# Patient Record
Sex: MALE | Race: BLACK OR AFRICAN AMERICAN | NOT HISPANIC OR LATINO
[De-identification: names, ages, dates, MRNs, and addresses within clinical notes are randomized per-mention and may not be internally consistent; named-entity substitution may affect disease eponyms.]

---

## 2022-05-06 PROBLEM — Z00.00 ENCOUNTER FOR PREVENTIVE HEALTH EXAMINATION: Status: ACTIVE | Noted: 2022-05-06

## 2022-05-09 ENCOUNTER — NON-APPOINTMENT (OUTPATIENT)
Age: 72
End: 2022-05-09

## 2022-05-09 ENCOUNTER — APPOINTMENT (OUTPATIENT)
Dept: UROLOGY | Facility: CLINIC | Age: 72
End: 2022-05-09
Payer: MEDICARE

## 2022-05-09 VITALS
HEIGHT: 71 IN | BODY MASS INDEX: 29.12 KG/M2 | DIASTOLIC BLOOD PRESSURE: 86 MMHG | SYSTOLIC BLOOD PRESSURE: 135 MMHG | HEART RATE: 68 BPM | WEIGHT: 208 LBS | TEMPERATURE: 97.2 F

## 2022-05-09 DIAGNOSIS — Z86.79 PERSONAL HISTORY OF OTHER DISEASES OF THE CIRCULATORY SYSTEM: ICD-10-CM

## 2022-05-09 LAB
BILIRUB UR QL STRIP: NORMAL
CLARITY UR: CLEAR
COLLECTION METHOD: NORMAL
GLUCOSE UR-MCNC: NORMAL
HCG UR QL: 0.2 EU/DL
HGB UR QL STRIP.AUTO: NORMAL
KETONES UR-MCNC: NORMAL
LEUKOCYTE ESTERASE UR QL STRIP: NORMAL
NITRITE UR QL STRIP: NORMAL
PH UR STRIP: 5.5
PROT UR STRIP-MCNC: NORMAL
SP GR UR STRIP: 1.02

## 2022-05-09 PROCEDURE — 99204 OFFICE O/P NEW MOD 45 MIN: CPT

## 2022-05-09 PROCEDURE — 81003 URINALYSIS AUTO W/O SCOPE: CPT | Mod: QW

## 2022-05-09 RX ORDER — ASPIRIN 81 MG
81 TABLET,CHEWABLE ORAL
Refills: 0 | Status: ACTIVE | COMMUNITY

## 2022-05-09 RX ORDER — AZELASTINE HYDROCHLORIDE 137 UG/1
137 SPRAY, METERED NASAL
Qty: 30 | Refills: 0 | Status: ACTIVE | COMMUNITY
Start: 2021-11-16

## 2022-05-09 RX ORDER — ALLOPURINOL 100 MG/1
100 TABLET ORAL
Qty: 90 | Refills: 0 | Status: ACTIVE | COMMUNITY
Start: 2021-10-03

## 2022-05-09 RX ORDER — METOPROLOL SUCCINATE 100 MG/1
100 TABLET, EXTENDED RELEASE ORAL
Qty: 30 | Refills: 0 | Status: ACTIVE | COMMUNITY
Start: 2021-10-03

## 2022-05-09 RX ORDER — ROSUVASTATIN CALCIUM 40 MG/1
40 TABLET, FILM COATED ORAL
Qty: 90 | Refills: 0 | Status: ACTIVE | COMMUNITY
Start: 2022-05-04

## 2022-05-09 RX ORDER — EZETIMIBE 10 MG/1
10 TABLET ORAL
Qty: 30 | Refills: 0 | Status: ACTIVE | COMMUNITY
Start: 2022-02-27

## 2022-05-09 NOTE — ASSESSMENT
[FreeTextEntry1] : 70 yo male with hx of microscopic hematuria.  \par \par A discussion was held with the patient regarding hematuria. The anatomy of the urinary tract was reviewed. Possible etiologies of hematuria were reviewed, including but not limited to, kidney, ureteral, bladder, and urethral sources. Urologic versus non-urologic causes of hematuria were reviewed including benign and malignant causes. The possibility of a missed lesion was reviewed as well as the chance of developing a lesion despite a negative evaluation. The work-up for hematuria was discussed including examination of the upper and lower urinary tract. The AUA guidelines on hematuria were reviewed. Follow up evaluation was reviewed. The possible need to repeat the evaluation was also discussed. \par \par The UA is not available for review today to determine if the dip or the microcopy showed blood.  I will repeat the UA today and if the microscopy shows 5 or more RBC then we will proceed with a hematuria evaluation. \par \par Plan:\par 1. UA\par 2. F/u after # 1

## 2022-05-09 NOTE — PHYSICAL EXAM
[General Appearance - Well Developed] : well developed [Normal Appearance] : normal appearance [Heart Rate And Rhythm] : Heart rate and rhythm were normal [] : no respiratory distress [Abdomen Soft] : soft [Abdomen Tenderness] : non-tender [Costovertebral Angle Tenderness] : no ~M costovertebral angle tenderness [FreeTextEntry1] : Umbilical hernia [Urethral Meatus] : meatus normal [Penis Abnormality] : normal uncircumcised penis [Epididymis] : the epididymides were normal [Testes Tenderness] : no tenderness of the testes [Testes Mass (___cm)] : there were no testicular masses [Prostate Tenderness] : the prostate was not tender [No Prostate Nodules] : no prostate nodules [Prostate Size ___ (0-4)] : prostate size [unfilled] (scale: 0-4) [Normal Station and Gait] : the gait and station were normal for the patient's age [Skin Color & Pigmentation] : normal skin color and pigmentation [No Focal Deficits] : no focal deficits [Oriented To Time, Place, And Person] : oriented to person, place, and time

## 2022-05-09 NOTE — HISTORY OF PRESENT ILLNESS
[FreeTextEntry1] : 70 yo male referred for hx of microscopic hematuria incidentally noted on UA.  He denies ever having gross hematuria.  He voids with minimal symptoms.  He notes occasional nocturia x 1, but generally voids without frequency urgency or dysuria.  He is happy with his urination.  Her sees his PCP annually hand has always had a PSA and SUZE wnl.  His most recent PSA was this year and was 2.1

## 2022-05-10 LAB
APPEARANCE: CLEAR
BACTERIA: NEGATIVE
BILIRUBIN URINE: NEGATIVE
BLOOD URINE: ABNORMAL
COLOR: YELLOW
GLUCOSE QUALITATIVE U: NEGATIVE
HYALINE CASTS: 0 /LPF
KETONES URINE: NEGATIVE
LEUKOCYTE ESTERASE URINE: ABNORMAL
MICROSCOPIC-UA: NORMAL
NITRITE URINE: NEGATIVE
PH URINE: 6
PROTEIN URINE: ABNORMAL
RED BLOOD CELLS URINE: 51 /HPF
SPECIFIC GRAVITY URINE: 1.02
SQUAMOUS EPITHELIAL CELLS: 0 /HPF
UROBILINOGEN URINE: NORMAL
WHITE BLOOD CELLS URINE: 14 /HPF

## 2022-05-11 LAB — BACTERIA UR CULT: NORMAL

## 2022-05-17 ENCOUNTER — OUTPATIENT (OUTPATIENT)
Dept: OUTPATIENT SERVICES | Facility: HOSPITAL | Age: 72
LOS: 1 days | End: 2022-05-17
Payer: COMMERCIAL

## 2022-05-17 ENCOUNTER — APPOINTMENT (OUTPATIENT)
Dept: CT IMAGING | Facility: HOSPITAL | Age: 72
End: 2022-05-17

## 2022-05-17 LAB — POCT ISTAT CREATININE: 1.2 MG/DL — SIGNIFICANT CHANGE UP (ref 0.5–1.3)

## 2022-05-17 PROCEDURE — 74178 CT ABD&PLV WO CNTR FLWD CNTR: CPT

## 2022-05-17 PROCEDURE — 74178 CT ABD&PLV WO CNTR FLWD CNTR: CPT | Mod: 26

## 2022-05-17 PROCEDURE — 82565 ASSAY OF CREATININE: CPT

## 2022-05-26 ENCOUNTER — APPOINTMENT (OUTPATIENT)
Dept: UROLOGY | Facility: CLINIC | Age: 72
End: 2022-05-26
Payer: MEDICARE

## 2022-05-26 VITALS
OXYGEN SATURATION: 99 % | HEART RATE: 88 BPM | SYSTOLIC BLOOD PRESSURE: 149 MMHG | DIASTOLIC BLOOD PRESSURE: 88 MMHG | TEMPERATURE: 98.2 F

## 2022-05-26 PROCEDURE — 99214 OFFICE O/P EST MOD 30 MIN: CPT

## 2022-05-26 NOTE — ASSESSMENT
[FreeTextEntry1] : 72 yo male with hx of microscopic hematuria found to have bilateral kidney stones and a bladder diverticulum.  \par \par The patient was seen and examined and results were reviewed. \par \par Kidney stone disease was reviewed with the patient and etiologies/risk factors were discussed. Preventative measures were discussed including hydration, diet modification, and medications.Options for management were reviewed including conservative management (no intervention), medical expulsive therapy, and surgical management. Merits and limitations of each option was discussed. \par \par The possibility of spontaneous stone passage based on stone size and location was reviewed. Risks of spontaneous passage including pain, hematuria, fever, chills, nausea/emesis, infection, urosepsis, dehydration, ureteral or renal injury, need for repeat office or emergency room visits, and permanent loss of renal function were reviewed.\par \par Surgical options were presented including ureteral stent placement, extracorporeal shockwave lithotripsy (ESWL), and ureteroscopy with laser lithotripsy. The procedures were discussed in depth. Success rates, complications, and potential for subsequent procedures was discussed for each option.The role of imaging studies including ultrasound, plain film x-rays, and CT scan were discussed, as well as potential radiation exposure risks. Metabolic evaluation with serum chemistry and 24 hour urine collections were presented for the purpose of determining the etiology of the patient's stone forming risk factors.\par \par He prefers to observe the stone for now.  We will repeat a renal US in 6 months.\par \par We also discussed the bladder diverticulum and his microscopic hematuria.  I recommend we also perform cystoscopy to complete his evaluation and evaluate his bladder diverticulum.  He agrees to do this next week.\par \par Plan:\par 1. Cystoscopy next week\par 2. Renal US in 6 months

## 2022-05-26 NOTE — HISTORY OF PRESENT ILLNESS
[FreeTextEntry1] : 5/9/22:\par 72 yo male referred for hx of microscopic hematuria incidentally noted on UA.  He denies ever having gross hematuria.  He voids with minimal symptoms.  He notes occasional nocturia x 1, but generally voids without frequency urgency or dysuria.  He is happy with his urination.  Her sees his PCP annually hand has always had a PSA and SUZE wnl.  His most recent PSA was this year and was 2.1\par \par *************\par 5/26/22:\par Patient returns for follow up to discuss his CT urogram results.  CT shows bilateral L > R nonobstructing renal stones, with the largest stone in the left kidney being 6 mm in size in the posterior calyx of the mid pole.  The CT also shows a bladder diverticulum coming off the bladder dome possibly a urachal remnant. The prostate was measured at roughly 40 cc.

## 2022-05-31 LAB — BACTERIA UR CULT: NORMAL

## 2022-06-02 ENCOUNTER — APPOINTMENT (OUTPATIENT)
Dept: UROLOGY | Facility: CLINIC | Age: 72
End: 2022-06-02

## 2022-06-20 ENCOUNTER — APPOINTMENT (OUTPATIENT)
Dept: UROLOGY | Facility: CLINIC | Age: 72
End: 2022-06-20
Payer: MEDICARE

## 2022-06-20 VITALS
DIASTOLIC BLOOD PRESSURE: 82 MMHG | OXYGEN SATURATION: 99 % | HEART RATE: 65 BPM | TEMPERATURE: 97.5 F | SYSTOLIC BLOOD PRESSURE: 143 MMHG

## 2022-06-20 DIAGNOSIS — N32.3 DIVERTICULUM OF BLADDER: ICD-10-CM

## 2022-06-20 LAB
BILIRUB UR QL STRIP: NORMAL
CLARITY UR: CLEAR
COLLECTION METHOD: NORMAL
GLUCOSE UR-MCNC: NORMAL
HCG UR QL: 0.2 EU/DL
HGB UR QL STRIP.AUTO: NORMAL
KETONES UR-MCNC: NORMAL
LEUKOCYTE ESTERASE UR QL STRIP: NORMAL
NITRITE UR QL STRIP: NORMAL
PH UR STRIP: 6
PROT UR STRIP-MCNC: 100
SP GR UR STRIP: 1.02

## 2022-06-20 PROCEDURE — 52000 CYSTOURETHROSCOPY: CPT

## 2022-06-20 PROCEDURE — 81003 URINALYSIS AUTO W/O SCOPE: CPT | Mod: QW

## 2022-06-21 LAB — URINE CYTOLOGY: NORMAL

## 2022-06-22 LAB
BACTERIA UR CULT: NORMAL
HLX UV FISH FINAL REPORT: NORMAL

## 2024-01-31 ENCOUNTER — APPOINTMENT (OUTPATIENT)
Dept: UROLOGY | Facility: CLINIC | Age: 74
End: 2024-01-31
Payer: MEDICARE

## 2024-01-31 VITALS
OXYGEN SATURATION: 99 % | HEIGHT: 71 IN | SYSTOLIC BLOOD PRESSURE: 143 MMHG | TEMPERATURE: 98.6 F | WEIGHT: 208 LBS | HEART RATE: 66 BPM | DIASTOLIC BLOOD PRESSURE: 90 MMHG | BODY MASS INDEX: 29.12 KG/M2

## 2024-01-31 DIAGNOSIS — R31.21 ASYMPTOMATIC MICROSCOPIC HEMATURIA: ICD-10-CM

## 2024-01-31 DIAGNOSIS — N20.0 CALCULUS OF KIDNEY: ICD-10-CM

## 2024-01-31 PROCEDURE — 99213 OFFICE O/P EST LOW 20 MIN: CPT

## 2024-01-31 NOTE — ASSESSMENT
[FreeTextEntry1] : 74 yo male with B/L renal stones.  We will get a CT stone hunt to assess his current stone.  He prefers to continue with surveillance if the stones have not changed is size and number.  Plan: 1. CT stone hunt 2. F/u after # 1

## 2024-01-31 NOTE — HISTORY OF PRESENT ILLNESS
[FreeTextEntry1] : 5/9/22: 70 yo male referred for hx of microscopic hematuria incidentally noted on UA.  He denies ever having gross hematuria.  He voids with minimal symptoms.  He notes occasional nocturia x 1, but generally voids without frequency urgency or dysuria.  He is happy with his urination.  Her sees his PCP annually hand has always had a PSA and SUZE wnl.  His most recent PSA was this year and was 2.1  ************* 5/26/22: Patient returns for follow up to discuss his CT urogram results.  CT shows bilateral L > R nonobstructing renal stones, with the largest stone in the left kidney being 6 mm in size in the posterior calyx of the mid pole.  The CT also shows a bladder diverticulum coming off the bladder dome possibly a urachal remnant. The prostate was measured at roughly 40 cc.    ***************  6/20/22 [Cysto note] 71 yo male with microscopic hematuria. He has known bilateral renal stones which he has chosen to observe. We will get a renal US in 6 months. I will send a urine cytology from the bladder diverticulum to assess for any abnormal cells. The area of erythema looked more consistent with inflammation.  ****************** 1/31/24: Patient returns for follow up.  He is feeling well.  He is voiding without complaint.  He had a recent SUZE and PSA with his PCP which was normal (PSA around 2).  He has not had renal imaging in 18 months.  he denies any episodes of renal colic.  He continues to have intermittent microscopic hematuria on UA.  His hematuria evaluation was negative except for B/L renal stones.

## 2024-02-09 ENCOUNTER — APPOINTMENT (OUTPATIENT)
Dept: CT IMAGING | Facility: HOSPITAL | Age: 74
End: 2024-02-09

## 2024-02-09 ENCOUNTER — OUTPATIENT (OUTPATIENT)
Dept: OUTPATIENT SERVICES | Facility: HOSPITAL | Age: 74
LOS: 1 days | End: 2024-02-09
Payer: COMMERCIAL

## 2024-02-09 PROCEDURE — 74176 CT ABD & PELVIS W/O CONTRAST: CPT

## 2024-02-09 PROCEDURE — 74176 CT ABD & PELVIS W/O CONTRAST: CPT | Mod: 26

## 2024-02-20 ENCOUNTER — NON-APPOINTMENT (OUTPATIENT)
Age: 74
End: 2024-02-20

## 2024-02-21 ENCOUNTER — APPOINTMENT (OUTPATIENT)
Dept: UROLOGY | Facility: CLINIC | Age: 74
End: 2024-02-21